# Patient Record
Sex: MALE | Race: ASIAN | NOT HISPANIC OR LATINO | Employment: FULL TIME | URBAN - METROPOLITAN AREA
[De-identification: names, ages, dates, MRNs, and addresses within clinical notes are randomized per-mention and may not be internally consistent; named-entity substitution may affect disease eponyms.]

---

## 2019-11-15 ENCOUNTER — OFFICE VISIT - HEALTHEAST (OUTPATIENT)
Dept: INTERNAL MEDICINE | Facility: CLINIC | Age: 24
End: 2019-11-15

## 2019-11-15 DIAGNOSIS — Z23 NEED FOR PROPHYLACTIC VACCINATION AND INOCULATION AGAINST INFLUENZA: ICD-10-CM

## 2019-11-15 DIAGNOSIS — Z23 NEED FOR TD VACCINE: ICD-10-CM

## 2019-11-15 DIAGNOSIS — E66.811 CLASS 1 OBESITY DUE TO EXCESS CALORIES WITHOUT SERIOUS COMORBIDITY WITH BODY MASS INDEX (BMI) OF 33.0 TO 33.9 IN ADULT: ICD-10-CM

## 2019-11-15 DIAGNOSIS — E66.09 CLASS 1 OBESITY DUE TO EXCESS CALORIES WITHOUT SERIOUS COMORBIDITY WITH BODY MASS INDEX (BMI) OF 33.0 TO 33.9 IN ADULT: ICD-10-CM

## 2019-11-15 LAB — HBA1C MFR BLD: 4.9 % (ref 3.5–6)

## 2019-11-19 ENCOUNTER — HOSPITAL ENCOUNTER (OUTPATIENT)
Dept: CARDIOLOGY | Facility: HOSPITAL | Age: 24
Discharge: HOME | End: 2019-11-19
Attending: INTERNAL MEDICINE

## 2019-11-19 DIAGNOSIS — E66.811 CLASS 1 OBESITY DUE TO EXCESS CALORIES WITHOUT SERIOUS COMORBIDITY WITH BODY MASS INDEX (BMI) OF 33.0 TO 33.9 IN ADULT: ICD-10-CM

## 2019-11-19 DIAGNOSIS — E66.09 CLASS 1 OBESITY DUE TO EXCESS CALORIES WITHOUT SERIOUS COMORBIDITY WITH BODY MASS INDEX (BMI) OF 33.0 TO 33.9 IN ADULT: ICD-10-CM

## 2019-11-19 LAB
CV STRESS CURRENT BP HE: NORMAL
CV STRESS CURRENT HR HE: 111
CV STRESS CURRENT HR HE: 113
CV STRESS CURRENT HR HE: 115
CV STRESS CURRENT HR HE: 117
CV STRESS CURRENT HR HE: 118
CV STRESS CURRENT HR HE: 120
CV STRESS CURRENT HR HE: 123
CV STRESS CURRENT HR HE: 123
CV STRESS CURRENT HR HE: 125
CV STRESS CURRENT HR HE: 125
CV STRESS CURRENT HR HE: 127
CV STRESS CURRENT HR HE: 130
CV STRESS CURRENT HR HE: 132
CV STRESS CURRENT HR HE: 133
CV STRESS CURRENT HR HE: 137
CV STRESS CURRENT HR HE: 141
CV STRESS CURRENT HR HE: 144
CV STRESS CURRENT HR HE: 147
CV STRESS CURRENT HR HE: 147
CV STRESS CURRENT HR HE: 148
CV STRESS CURRENT HR HE: 148
CV STRESS CURRENT HR HE: 154
CV STRESS CURRENT HR HE: 157
CV STRESS CURRENT HR HE: 158
CV STRESS CURRENT HR HE: 159
CV STRESS CURRENT HR HE: 166
CV STRESS CURRENT HR HE: 166
CV STRESS CURRENT HR HE: 167
CV STRESS CURRENT HR HE: 172
CV STRESS CURRENT HR HE: 181
CV STRESS CURRENT HR HE: 184
CV STRESS DEVIATION TIME HE: NORMAL
CV STRESS ECHO PERCENT HR HE: NORMAL
CV STRESS EXERCISE STAGE HE: NORMAL
CV STRESS FINAL RESTING BP HE: NORMAL
CV STRESS FINAL RESTING HR HE: 111
CV STRESS MAX HR HE: 184
CV STRESS MAX TREADMILL GRADE HE: 16
CV STRESS MAX TREADMILL SPEED HE: 4.2
CV STRESS PEAK DIA BP HE: NORMAL
CV STRESS PEAK SYS BP HE: NORMAL
CV STRESS PHASE HE: NORMAL
CV STRESS PROTOCOL HE: NORMAL
CV STRESS RESTING PT POSITION HE: NORMAL
CV STRESS RESTING PT POSITION HE: NORMAL
CV STRESS ST DEVIATION AMOUNT HE: NORMAL
CV STRESS ST DEVIATION ELEVATION HE: NORMAL
CV STRESS ST EVELATION AMOUNT HE: NORMAL
CV STRESS TEST TYPE HE: NORMAL
CV STRESS TOTAL STAGE TIME MIN 1 HE: NORMAL
ECHO EJECTION FRACTION ESTIMATED: 65 %
RATE PRESSURE PRODUCT: NORMAL
STRESS ECHO BASELINE DIASTOLIC HE: 82
STRESS ECHO BASELINE HR: 107
STRESS ECHO BASELINE SYSTOLIC BP: 118
STRESS ECHO CALCULATED PERCENT HR: 94 %
STRESS ECHO LAST STRESS DIASTOLIC BP: 66
STRESS ECHO LAST STRESS HR: 184
STRESS ECHO LAST STRESS SYSTOLIC BP: 158
STRESS ECHO POST ESTIMATED WORKLOAD: 11.7
STRESS ECHO POST EXERCISE DUR MIN: 10
STRESS ECHO POST EXERCISE DUR SEC: 3
STRESS ECHO TARGET HR: 196

## 2019-11-20 ENCOUNTER — COMMUNICATION - HEALTHEAST (OUTPATIENT)
Dept: INTERNAL MEDICINE | Facility: CLINIC | Age: 24
End: 2019-11-20

## 2020-07-07 ENCOUNTER — COMMUNICATION - HEALTHEAST (OUTPATIENT)
Dept: SCHEDULING | Facility: CLINIC | Age: 25
End: 2020-07-07

## 2020-07-07 ENCOUNTER — VIRTUAL VISIT (OUTPATIENT)
Dept: FAMILY MEDICINE | Facility: OTHER | Age: 25
End: 2020-07-07

## 2020-07-08 ENCOUNTER — AMBULATORY - HEALTHEAST (OUTPATIENT)
Dept: FAMILY MEDICINE | Facility: CLINIC | Age: 25
End: 2020-07-08

## 2020-07-08 DIAGNOSIS — Z20.822 SUSPECTED COVID-19 VIRUS INFECTION: ICD-10-CM

## 2020-07-29 NOTE — PROGRESS NOTES
"Date: 2020 14:48:32  Clinician: Viral Szymanski  Clinician NPI: [id number]  Patient: ninoska saldana  Patient : 1995  Patient Address: [address] [place] [address]  Patient Phone: [phone]  Visit Protocol: URI  Patient Summary:  ninoska is a 24 year old ( : 1995 ) male who initiated a Visit for COVID-19 (Coronavirus) evaluation and screening. When asked the question \"Please sign me up to receive news, health information and promotions. \", ninoska responded \"No\".    When asked when his symptoms started, ninoska reported that he does not have any symptoms.   He denies having recent facial or sinus surgery in the past 60 days and taking antibiotic medication in the past month.    Pertinent COVID-19 (Coronavirus) information  In the past 14 days, ninoska has not worked in a congregate living setting.   He does not work or volunteer as healthcare worker or a  and does not work or volunteer in a healthcare facility.   ninoska also has not lived in a congregate living setting in the past 14 days. He lives with a healthcare worker.   ninoska has had a close contact with a laboratory-confirmed COVID-19 patient in the last 14 days. Additional information about contact with COVID-19 (Coronavirus) patient as reported by the patient (free text): my cousin   Pertinent medical history  ninoska needs a return to work/school note.   Weight: 180 lbs   ninsoka does not smoke or use smokeless tobacco.   Weight: 180 lbs    MEDICATIONS: No current medications, ALLERGIES: NKDA  Clinician Response:  Dear tundenitin,   Based on your exposure to COVID-19 (coronavirus), we would like to test you for this virus.  1. Please call [phone] to schedule your visit. Explain that you were referred by [facility] to have a COVID-19 test. Be ready to share your [facility] visit ID number.  The following will serve as your written order for this COVID Test, ordered by " me, for the indication of suspected COVID [Z20.828]: The test will be ordered in [employer], our electronic health record, after you are scheduled. It will show as ordered and authorized by Savage Martinez MD.  Order: COVID-19 (coronavirus) PCR for ASYMPTOMATIC EXPOSURE [facility].  If you know you have had close contact with someone who tested positive, you should be quarantined for 14 days after this exposure. You should stay in quarantine for the14 days even if the covid test is negative, the optimal time to test after exposure is 5-7 days from the exposure  Quarantine means   What should I do?  For safety, it's very important to follow these rules. Do this for 14 days after the date you were last exposed to the virus..  Stay home and away from others. Don't go to school or anywhere else. Generally quarantine means staying home for work but there are some exceptions to this. Please contact your workplace.   No hugging, kissing or shaking hands.  Don't let anyone visit.  Cover your mouth and nose with a mask, tissue or washcloth to avoid spreading germs.  Wash your hands and face often. Use soap and water.  What are the symptoms of COVID-19?  The most common symptoms are cough, fever and trouble breathing. Less common symptoms include headache, body aches, fatigue (feeling very tired), chills, sore throat, stuffy or runny nose, diarrhea (loose poop), loss of taste or smell, belly pain, and nausea or vomiting (feeling sick to your stomach or throwing up).  After 14 days, if you have still don't have symptoms, you likely don't have this virus.  If you develop symptoms, follow these guidelines.  If you're normally healthy: Please start another [facility] visit to report your symptoms. Go to [email or web address].  If you have a serious health problem (like cancer, heart failure, an organ transplant or kidney disease): Call your specialty clinic. Let them know that you might have COVID-19.  2. When it's time for your COVID test:   Stay at least 6 feet away from others. (If someone will drive you to your test, stay in the backseat, as far away from the  as you can.)  Cover your mouth and nose with a mask, tissue or washcloth.  Go straight to the testing site. Don't make any stops on the way there or back.  Please note  Caregivers in these groups are at risk for severe illness due to COVID-19:  o People 65 years and older  o People who live in a nursing home or long-term care facility  o People with chronic disease (lung, heart, cancer, diabetes, kidney, liver, immunologic)  o People who have a weakened immune system, including those who:  Are in cancer treatment  Take medicine that weakens the immune system, such as corticosteroids  Had a bone marrow or organ transplant  Have an immune deficiency  Have poorly controlled HIV or AIDS  Are obese (body mass index of 40 or higher)  Smoke regularly  Where can I get more information?  [facility] -- About COVID-19: [email or web address]  CDC -- What to Do If You're Sick: www.cdc.gov/coronavirus/2019-ncov/about/steps-when-sick.html  CDC -- Ending Home Isolation: www.cdc.gov/coronavirus/2019-ncov/hcp/disposition-in-home-patients.html  CDC -- Caring for Someone: www.cdc.gov/coronavirus/2019-ncov/if-you-are-sick/care-for-someone.html  [facility] -- Interim Guidance for Hospital Discharge to Home: www.health.[facility].[place]/diseases/coronavirus/hcp/hospdischarge.pdf  [facility] clinical trials (COVID-19 research studies): clinicalaffairs.[facility]/n-clinical-trials  Below are the COVID-19 hotlines at the [facility] ([facility]). Interpreters are available.  For health questions: Call 519-603-2721 or 1-693.741.9084 (7 a.m. to 7 p.m.)  For questions about schools and childcare: Call 464-150-4040 or 1-480.927.4485 (7 a.m. to 7 p.m.)    Diagnosis: Contact with and (suspected) exposure to other viral communicable diseases  Diagnosis ICD: Z20.828

## 2021-06-03 NOTE — PROGRESS NOTES
"[facility] Clinic Note  Tyson Fletcher   23 y.o. male    Date of Visit: 11/15/2019  Chief Complaint   Patient presents with     Establish Care     Orders Request     ECHO due to abnormal stress test     Assessment/Plan  1. Need for Td vaccine  - Td, Preservative Free (green label)    2. Need for prophylactic vaccination and inoculation against influenza  - Influenza, Seasonal Quad, PF =/> 6months    3. Class 1 obesity due to excess calories without serious comorbidity with body mass index (BMI) of 33.0 to 33.9 in adult  A1c 4.9%.  Deferred from doing lipid panel.  Provided counseling verbally and via instructions regarding decrease salt intake/DASH diet, as well as increase physical activity and decreasing alcohol intake.  Requested to see patient in 3 months.  Once results from stress echo are obtained, will need to be forwarded to [facility] at [address] [place] [address]. [place] [address].  [phone] (Ph) [phone] (Fax)  - Echo Stress Exercise; Future  - Glycosylated Hemoglobin A1c     Much or all of the text in this note was generated through the use of Dragon Dictate voice-to-text software. Errors in spelling or words which seem out of context are unintentional. Sound alike errors, in particular, may have escaped editing  Parminder Medrano MD    Return in about 3 months (around 2/15/2020), or if symptoms worsen or fail to improve.    Subjective  This 23 y.o. old male who presents to establish care and regarding a stress test. Underwent a stress test for a  exam today at [facility] in [place]. States that he underwent a treadmill stress test when he was noted to be borderline abnormal. Denies chest pain, palpitations, LH or dizziness, arm or jaw pain.   He presents with a note from Dr EDWIN Jose MD MPH for a 2D stress echo echocardiogram to r/o valvular disease, check chamber size and check LV function. BMI 33.7 for 5'3\". Takes no medications.  I ended up speaking with " Dr. Jose regarding the stress test.  She notes that the patient was unable to go above 7 METS and is required further  patrol program to be able to perform up to 12 METS to tolerate the boot camp. Developed arrhythmias in the form of PVCs, was diaphoretic and had a near syncopal episode.  Blood pressure 116/84 and may have had a split S2.  Endorses moderate salt intake. Rsumed exercises 2 weeks ago, goes 2 to 3 times in a week. Drinks lots of caffeine, and less soda. He is fasting today. Deferred a flu shot.     ROS A comprehensive review of systems was performed and was otherwise negative    Medications, allergies, and problem list were reviewed and updated    Exam  General appearance: Pleasant, nontoxic-appearing, no acute distress, alert and oriented x4  Vitals:    11/15/19 1443   BP: 132/84   Pulse: 93   SpO2: 97%     EYES: Eyelids, conjunctiva, and sclera were normal. Pupils were normal. Cornea, iris, and lens were normal bilaterally.  HEAD, EARS, NOSE, MOUTH, AND THROAT: Head and face were normal. Hearing was normal to voice and the ears were normal to external exam. Nose appearance was normal and there was no discharge. Oropharynx was normal.  NECK: Neck appearance was normal. There were no neck masses and the thyroid was not enlarged.  RESPIRATORY: Bilaterally with no crackles, wheezing or rhonchi  CARDIOVASCULAR: Regular S1 and S2.  Radial pulses intact. No edema.  GASTROINTESTINAL: NABS, obese with central obesity, soft, nontender, nondistended, no hepatomegaly  MUSCULOSKELETAL: Skeletal configuration was normal and muscle mass was normal for age. Joint appearance was overall normal.  SKIN/HAIR/NAILS: Skin color was normal.  There were no skin lesions.   NEUROLOGIC: Alert and oriented to person, place, time, and circumstance. Speech was normal. Cranial nerves were normal. Motor strength was normal for age   PSYCHIATRIC:  Mood and affect were normal and the patient had normal recent and  remote memory.     Additional Information   No current outpatient medications on file.     No current facility-administered medications for this visit.      No Known Allergies  Social History     Social History Narrative    [place]oved [place] the states 10 years from [place] with his father. Has a half sibling in [employer]. Lives in cousin in [facility]. Works in security, and wants to become a . Knows little about his biological mother. Feels safe at home. Has guns at home (has a lock, and is in a safe). No SI/HI and never dx with mood disorder, MDD or MARY.      Social History     Tobacco Use     Smoking status: Light Tobacco Smoker     Types: Cigarettes     Smokeless tobacco: Never Used     Tobacco comment: smokes 3-4 cigarettes a year   Substance Use Topics     Alcohol use: Yes     Alcohol/week: 12.0 standard drinks     Types: 4 Glasses of wine, 4 Cans of beer, 4 Shots of liquor per week     Frequency: 2-4 times a month     Drinks per session: 5 or 6     Binge frequency: Monthly     Drug use: Never     Family History   Problem Relation Age of Onset     Hypertension Father

## 2021-06-03 NOTE — TELEPHONE ENCOUNTER
Spoke with the patient and relayed message below from Dr. Medrano.  Patient verbalized understanding and had no further questions at this time.  Mindy COYLE, RODGER/VALENTIN....................1:49 PM

## 2021-06-03 NOTE — TELEPHONE ENCOUNTER
Please let patient know that the echocardiogram was normal.  That we have faxed the information to [facility].  And that he should try to adhere to the recommendations regarding his salt intake, exercise and weight loss that we discussed during his clinic visit.  Thank you

## 2021-06-03 NOTE — TELEPHONE ENCOUNTER
Dr. Medrano,  Patient would like to know if there are any further recommendations.    Please advise.  Thank you.  Mindy COYLE, RODGER/CMT....................3:42 PM

## 2021-06-03 NOTE — TELEPHONE ENCOUNTER
Test Results  Who is calling?:  Patient   Who ordered the test:  Parminder Medrano MD   Type of test: Other: Echo Stress Exercise Test  Date of test:  11/19/2019  Where was the test performed:  [facility]  What are your questions/concerns?:  Patient called for results of Echo Stress Test. Writer informed patient of normal result and that he will be receiving a letter with this information as well.  Patient also informed a copy of the letter was faxed to [facility].    Caller is questioning if there are any further recommendations or follow up that the provider would recommend?  Okay to leave a detailed message?:  Yes

## 2021-06-04 VITALS
OXYGEN SATURATION: 97 % | DIASTOLIC BLOOD PRESSURE: 84 MMHG | SYSTOLIC BLOOD PRESSURE: 132 MMHG | WEIGHT: 190 LBS | HEART RATE: 93 BPM

## 2021-06-09 NOTE — TELEPHONE ENCOUNTER
Maria L got tested for Corona for over one week and today is calling for results.  Maria L got tested for covid 19 at [facility].  FNA advised to contact [facility] for results.  FNA advised to go to [email or web address] if not able to get results or needs further testing.      COVID 19 Nurse Triage Plan/Patient Instructions    Please be aware that novel coronavirus (COVID-19) may be circulating in the community. If you develop symptoms such as fever, cough, or SOB or if you have concerns about the presence of another infection including coronavirus (COVID-19), please contact your health care provider or visit [email or web address].     Disposition/Instructions    Patient to stay at home and follow home care protocol based instructions.    Thank you for taking steps to prevent the spread of this virus.  o Limit your contact with others.  o Wear a simple mask to cover your cough.  o Wash your hands well and often.    Resources    [facility]: About COVID-19: [email or web address]    CDC: What to Do If You're Sick: www.cdc.gov/coronavirus/2019-ncov/about/steps-when-sick.html    CDC: Ending Home Isolation: www.cdc.gov/coronavirus/2019-ncov/hcp/disposition-in-home-patients.html     CDC: Caring for Someone: www.cdc.gov/coronavirus/2019-ncov/if-you-are-sick/care-for-someone.html     [facility]: Interim Guidance for Hospital Discharge to Home: www.health.[facility].mn.us/diseases/coronavirus/hcp/hospdischarge.pdf    [facility] clinical trials (COVID-19 research studies): clinicalaffairs.[facility]/n-clinical-trials     Below are the COVID-19 hotlines at the [place] [facility] ([facility]). Interpreters are available.   o For health questions: Call 977-172-9705 or 1-824.479.5618 (7 a.m. to 7 p.m.)  o For questions about schools and childcare: Call 137-703-5749 or 1-177.676.6661 (7 a.m. to 7 p.m.)       Reason for Disposition    Nursing judgment or information in reference    Additional Information    Negative: Nursing judgment, per  information in Reference    Negative: Information only call about a Well Adult (no illness or injury)    Negative: Nursing judgment or information in reference    Negative: Nursing judgment or information in reference    Negative: Nursing judgment or information in reference    Negative: Nursing judgment or information in reference    Negative: Nursing judgment or information in reference    Negative: Nursing judgment or information in reference    Negative: Nursing judgment or information in reference    Negative: Nursing judgment or information in reference    Negative: Nursing judgment or information in reference    Negative: Nursing judgment or information in reference    Negative: Nursing judgment or information in reference    Negative: Nursing judgment or information in reference    Negative: Nursing judgment or information in reference    Protocols used: NO GUIDELINE [id number]

## 2021-06-17 NOTE — PATIENT INSTRUCTIONS - HE
Patient Instructions by Parminder Medrano MD at 11/15/2019  3:00 PM     Author: Parminder Medrano MD Service: -- Author Type: Physician    Filed: 11/15/2019  3:45 PM Encounter Date: 11/15/2019 Status: Addendum    : Parminder Medrano MD (Physician)    Related Notes: Original Note by Parminder Medrano MD (Physician) filed at 11/15/2019  3:43 PM       Patient Education     Low-Salt Diet  This diet removes foods that are high in salt. It also limits the amount of salt you use when cooking. It is most often used for people with high blood pressure, edema (fluid retention), and kidney, liver, or heart disease.  Table salt contains the mineral sodium. Your body needs sodium to work normally. But too much sodium can make your health problems worse. Your healthcare provider is recommending a low-salt (also called low-sodium) diet for you. Your total daily allowance of salt is 1,500 to 2,300 milligrams (mg). It is less than 1 teaspoon of table salt. This means you can have only about 500 to 700 mg of sodium at each meal. People with certain health problems should limit salt intake to the lower end of the recommended range.    When you cook, dont add much salt. If you can cook without using salt, even better. Dont add salt to your food at the table.  When shopping, read food labels. Salt is often called sodium on the label. Choose foods that are salt-free, low salt, or very low salt. Note that foods with reduced salt may not lower your salt intake enough.    Beans, potatoes, and pasta  Ok: Dry beans, split peas, lentils, potatoes, rice, macaroni, pasta, spaghetti without added salt  Avoid: Potato chips, tortilla chips, and similar products  Breads and cereals  Ok: Low-sodium breads, rolls, cereals, and cakes; low-salt crackers, matzo crackers  Avoid: Salted crackers, pretzels, popcorn, Fijian toast, pancakes, muffins  Dairy  Ok: Milk, chocolate milk, hot chocolate mix, low-salt  cheeses, and yogurt  Avoid: Processed cheese and cheese spreads; Roquefort, Camembert, and cottage cheese; buttermilk, instant breakfast drink  Desserts  Ok: Ice cream, frozen yogurt, juice bars, gelatin, cookies and pies, sugar, honey, jelly, hard candy  Avoid: Most pies, cakes and cookies prepared or processed with salt; instant pudding  Drinks  Ok: Tea, coffee, fizzy (carbonated) drinks, juices  Avoid: Flavored coffees, electrolyte replacement drinks, sports drinks  Meats  Ok: All fresh meat, fish, poultry, low-salt tuna, eggs, egg substitute  Avoid: Smoked, pickled, brine-cured, or salted meats and fish. This includes andrade, chipped beef, corned beef, hot dogs, deli meats, ham, kosher meats, salt pork, sausage, canned tuna, salted codfish, smoked salmon, herring, sardines, or anchovies.  Seasonings and spices  Ok: Most seasonings are okay. Good substitutes for salt include: fresh herb blends, hot sauce, lemon, garlic, gonzalez, vinegar, dry mustard, parsley, cilantro, horseradish, tomato paste, regular margarine, mayonnaise, unsalted butter, cream cheese, vegetable oil, cream, low-salt salad dressing and gravy.  Avoid: Regular ketchup, relishes, pickles, soy sauce, teriyaki sauce, Worcestershire sauce, BBQ sauce, tartar sauce, meat tenderizer, chili sauce, regular gravy, regular salad dressing, salted butter  Soups  Ok: Low-salt soups and broths made with allowed foods  Avoid: Bouillon cubes, soups with smoked or salted meats, regular soup and broth  Vegetables  Ok: Most vegetables are okay; also low-salt tomato and vegetable juices  Avoid: Sauerkraut and other brine-soaked vegetables; pickles and other pickled vegetables; tomato juice, olives    Patient Education     Aerobic Exercise for a Healthy Heart  Exercise is a lot more than an energy booster and a stress reliever. It also strengthens your heart muscle, lowers your blood pressure and cholesterol, and burns calories. It can also improve your resting muscle  tone, and your mood.     Choose an aerobic activity  Choose an activity that makes your heart and lungs work harder than they do when you rest or walk normally. This aerobic exercise can improve the way your heart and other muscles use oxygen. Make it fun by exercising with a friend and choosing an activity you enjoy. Here are some ideas:    Walking    Swimming    Bicycling    Stair climbing    Dancing    Jogging    Gardening  Exercise regularly  If you havent been exercising regularly,  get your doctors OK first. Then start slowly.  Here are some tips:    Begin exercising 3 times a week for 5 to 10 minutes at a time.    When you feel comfortable, add a few minutes each session.    Slowly build up to exercising 3 to 4 times each week. Each session should last for 40 minutes, on average, and involve moderate- to vigorous-intensity physical activity.    Your goal should be at least 30 minutes of moderate-intensity aerobic activity at least 5 days per week for a total of 150 or at least 25 minutes of vigorous aerobic activity at least 3 days per week for a total of 75 minutes    If you have been given nitroglycerin, be sure to carry it when you exercise.    If you get chest pain (angina) when youre exercising, stop what youre doing, take your nitroglycerin, and call your doctor.  Date Last Reviewed: 6/2/2016 2000-2019 [employer]. [address] [place] [address], [place] [address]. All rights reserved. This information is not intended as a substitute for professional medical care. Always follow your healthcare professional's instructions.

## 2021-06-19 NOTE — LETTER
Letter by Parminder Medrano MD at      Author: Parminder Medrano MD Service: -- Author Type: --    Filed:  Encounter Date: 11/20/2019 Status: Signed         Tyson Fletcher  [id number]  [facility] [address]             November 20, 2019         Dear Mr. Fletcher,    Below are the results from your recent visit:    Resulted Orders   Echo Stress Exercise   Result Value Ref Range    Pharmacologic Protocol  Stress Echo     Test Type Treadmill     Baseline      Baseline Systolic      Baseline Diastolic BP 82     Last Stress      Last Stress Systolic      Last Stress Diastolic BP 66     Target      PERCENT HR 85%     Exercise duration (min) 10     Exercise duration (sec) 3     Estimated workload 11.7     ST Deviation Elevation V3 1.1mm     Deviation Time II -1.1mm     ST Elevation Amount V3 1.5mm     ST Deviation Amount he III -1.2mm     Final Resting /90     Final Resting      Max Treadmill Speed 4.2     Max Treadmill Grade 16.0     Peak Systolic /66     Peak Diastolic /90     Max      Stress Phase Resting     Stress Resting Pt Position STANDING     Current      Current /82     Stress Phase Resting     Stress Resting Pt Position MANUAL EVENT     Current      Current /66     Stress Phase Stress     Stage Minute EXE 00:00     Exercise Stage STAGE 1     Current      Current /84     Stress Phase Stress     Stage Minute EXE 01:00     Exercise Stage STAGE 1     Current      Current /84     Stress Phase Stress     Stage Minute EXE 02:00     Exercise Stage STAGE 1     Current      Current /84     Stress Phase Stress     Stage Minute EXE 02:36     Exercise Stage STAGE 1     Current      Current /76     Stress Phase Stress     Stage Minute EXE 03:00     Exercise Stage STAGE 2     Current      Current /76     Stress Phase Stress     Stage Minute EXE 04:00      Exercise Stage STAGE 2     Current      Current /76     Stress Phase Stress     Stage Minute EXE 05:00     Exercise Stage STAGE 2     Current      Current /76     Stress Phase Stress     Stage Minute EXE 05:00     Exercise Stage STAGE 2     Current      Current /72     Stress Phase Stress     Stage Minute EXE 06:00     Exercise Stage STAGE 3     Current      Current /72     Stress Phase Stress     Stage Minute EXE 07:00     Exercise Stage STAGE 3     Current      Current /72     Stress Phase Stress     Stage Minute EXE 07:01     Exercise Stage STAGE 3     Current      Current /66     Stress Phase Stress     Stage Minute EXE 07:30     Exercise Stage STAGE 3     Current      Current /66     Stress Phase Stress     Stage Minute EXE 08:00     Exercise Stage STAGE 3     Current      Current /66     Stress Phase Stress     Stage Minute EXE 09:00     Exercise Stage STAGE 4     Current      Current /66     Stress Phase Stress     Stage Minute EXE 10:00     Exercise Stage STAGE 4     Current      Current /66     Stress Phase Stress     Stage Minute EXE 10:03     Exercise Stage STAGE 4     Current      Current /66     Stress Phase Recovery     Stage Minute REC 00:56     Exercise Stage Recovery     Current      Current /66     Stress Phase Recovery     Stage Minute REC 01:49     Exercise Stage Recovery     Current      Current BP 69/66     Stress Phase Recovery     Stage Minute REC 01:56     Exercise Stage Recovery     Current      Current BP 69/66     Stress Phase Recovery     Stage Minute REC 02:30     Exercise Stage Recovery     Current      Current /77     Stress Phase Recovery     Stage Minute REC 02:56     Exercise Stage Recovery     Current      Current /77     Stress Phase Recovery     Stage Minute REC 03:42     Exercise Stage Recovery      Current      Current /89     Stress Phase Recovery     Stage Minute REC 03:56     Exercise Stage Recovery     Current      Current /89     Stress Phase Recovery     Stage Minute REC 04:56     Exercise Stage Recovery     Current      Current /89     Stress Phase Recovery     Stage Minute REC 05:42     Exercise Stage Recovery     Current      Current /90     Stress Phase Recovery     Stage Minute REC 05:56     Exercise Stage Recovery     Current      Current /90     Stress Phase Recovery     Stage Minute REC 06:56     Exercise Stage Recovery     Current      Current /90     Stress Phase Recovery     Stage Minute REC 07:56     Exercise Stage Recovery     Current      Current /90     Stress Phase Recovery     Stage Minute REC 08:32     Exercise Stage Recovery     Current      Current /90     Calculated Percent HR 94 %    Rate Pressure Product 29,072.0     Echo LVEF Estimated 65 %    Narrative      1.The patient's exercise tolerance was normal achieving 11.7 METS on the   standard Hernandez protocol.    2.The stress electrocardiogram is negative for inducible ischemic EKG   changes after 10 minutes and 3 seconds on the standard Hernandez protocol. No   symptoms recorded.    3.Left ventricle ejection fraction is normal. The estimated left   ventricular ejection fraction is 65%. No significant abnormality seen on   screening baseline echo.    4.Stress echocardiogram is negative for inducible ischemia.    5.No previous study for comparison.          Above are the results of your stress test. This has also been faxed to [facility].    Please call with questions or contact us using [employer]t.    Sincerely,        Electronically signed by Parminder Medrano MD

## 2021-06-27 ENCOUNTER — HEALTH MAINTENANCE LETTER (OUTPATIENT)
Age: 26
End: 2021-06-27

## 2021-10-17 ENCOUNTER — HEALTH MAINTENANCE LETTER (OUTPATIENT)
Age: 26
End: 2021-10-17

## 2022-07-24 ENCOUNTER — HEALTH MAINTENANCE LETTER (OUTPATIENT)
Age: 27
End: 2022-07-24

## 2022-10-01 ENCOUNTER — HEALTH MAINTENANCE LETTER (OUTPATIENT)
Age: 27
End: 2022-10-01

## 2023-08-12 ENCOUNTER — HEALTH MAINTENANCE LETTER (OUTPATIENT)
Age: 28
End: 2023-08-12

## 2024-12-18 ENCOUNTER — TRANSFERRED RECORDS (OUTPATIENT)
Dept: HEALTH INFORMATION MANAGEMENT | Facility: CLINIC | Age: 29
End: 2024-12-18